# Patient Record
Sex: FEMALE | Race: WHITE | Employment: UNEMPLOYED | URBAN - METROPOLITAN AREA
[De-identification: names, ages, dates, MRNs, and addresses within clinical notes are randomized per-mention and may not be internally consistent; named-entity substitution may affect disease eponyms.]

---

## 2020-02-25 ENCOUNTER — HOSPITAL ENCOUNTER (INPATIENT)
Facility: CLINIC | Age: 35
LOS: 3 days | Discharge: HOME | End: 2020-02-28
Attending: EMERGENCY MEDICINE | Admitting: PSYCHIATRY & NEUROLOGY
Payer: MEDICAID

## 2020-02-25 DIAGNOSIS — R45.851 DEPRESSION WITH SUICIDAL IDEATION: Primary | ICD-10-CM

## 2020-02-25 DIAGNOSIS — R45.851 SUICIDAL IDEATION: ICD-10-CM

## 2020-02-25 DIAGNOSIS — F32.A DEPRESSION WITH SUICIDAL IDEATION: Primary | ICD-10-CM

## 2020-02-25 LAB
AMPHETAMINES UR QL SCN: NEGATIVE
BARBITURATES UR QL: NEGATIVE
BENZODIAZ UR QL: NEGATIVE
CANNABINOIDS UR QL SCN: POSITIVE
COCAINE UR QL: NEGATIVE
HCG UR QL: NEGATIVE
OPIATES UR QL SCN: NEGATIVE
PCP UR QL SCN: NEGATIVE

## 2020-02-25 PROCEDURE — 80307 DRUG TEST PRSMV CHEM ANLYZR: CPT | Performed by: EMERGENCY MEDICINE

## 2020-02-25 PROCEDURE — 90791 PSYCH DIAGNOSTIC EVALUATION: CPT

## 2020-02-25 PROCEDURE — 12400000 ZZH R&B MH

## 2020-02-25 PROCEDURE — 25000132 ZZH RX MED GY IP 250 OP 250 PS 637: Performed by: PSYCHIATRY & NEUROLOGY

## 2020-02-25 PROCEDURE — 99285 EMERGENCY DEPT VISIT HI MDM: CPT | Mod: 25

## 2020-02-25 PROCEDURE — 81025 URINE PREGNANCY TEST: CPT | Performed by: EMERGENCY MEDICINE

## 2020-02-25 RX ORDER — HYDROXYZINE HYDROCHLORIDE 25 MG/1
25 TABLET, FILM COATED ORAL EVERY 4 HOURS PRN
Status: DISCONTINUED | OUTPATIENT
Start: 2020-02-25 | End: 2020-02-28 | Stop reason: HOSPADM

## 2020-02-25 RX ORDER — QUETIAPINE FUMARATE 25 MG/1
25 TABLET, FILM COATED ORAL EVERY 4 HOURS PRN
Status: DISCONTINUED | OUTPATIENT
Start: 2020-02-25 | End: 2020-02-28 | Stop reason: HOSPADM

## 2020-02-25 RX ORDER — SERTRALINE HYDROCHLORIDE 100 MG/1
100 TABLET, FILM COATED ORAL DAILY
Status: DISCONTINUED | OUTPATIENT
Start: 2020-02-26 | End: 2020-02-25

## 2020-02-25 RX ORDER — SERTRALINE HYDROCHLORIDE 100 MG/1
100 TABLET, FILM COATED ORAL DAILY
COMMUNITY

## 2020-02-25 RX ORDER — SERTRALINE HYDROCHLORIDE 100 MG/1
100 TABLET, FILM COATED ORAL DAILY
Status: DISCONTINUED | OUTPATIENT
Start: 2020-02-26 | End: 2020-02-28 | Stop reason: HOSPADM

## 2020-02-25 RX ADMIN — QUETIAPINE 25 MG: 25 TABLET ORAL at 23:53

## 2020-02-25 ASSESSMENT — ACTIVITIES OF DAILY LIVING (ADL)
FALL_HISTORY_WITHIN_LAST_SIX_MONTHS: NO
TRANSFERRING: 0-->INDEPENDENT
AMBULATION: 0-->INDEPENDENT
RETIRED_EATING: 0-->INDEPENDENT
RETIRED_COMMUNICATION: 0-->UNDERSTANDS/COMMUNICATES WITHOUT DIFFICULTY
TOILETING: 0-->INDEPENDENT
COGNITION: 0 - NO COGNITION ISSUES REPORTED
SWALLOWING: 0-->SWALLOWS FOODS/LIQUIDS WITHOUT DIFFICULTY
BATHING: 0-->INDEPENDENT
DRESS: 0-->INDEPENDENT

## 2020-02-25 ASSESSMENT — MIFFLIN-ST. JEOR
SCORE: 1486.53
SCORE: 1493.79

## 2020-02-25 ASSESSMENT — ENCOUNTER SYMPTOMS: DYSPHORIC MOOD: 1

## 2020-02-25 NOTE — ED TRIAGE NOTES
Post partum depression. Pt is having suicidal ideations with no plan. Scared she will hurt 3 month old infant.
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
## 2020-02-25 NOTE — PHARMACY-ADMISSION MEDICATION HISTORY
Pharmacy Medication History  Admission medication history interview status for the 2/25/2020  admission is complete. See EPIC admission navigator for prior to admission medications     Medication history sources: Patient  Medication history source reliability: Good  Adherence assessment: Good    Significant changes made to the medication list:      Additional medication history information:       Medication reconciliation completed by provider prior to medication history? No    Time spent in this activity: 5 min      Prior to Admission medications    Medication Sig Last Dose Taking? Auth Provider   sertraline (ZOLOFT) 100 MG tablet Take 100 mg by mouth daily 2/25/2020 at Unknown time Yes Unknown, Entered By History

## 2020-02-26 LAB
ANION GAP SERPL CALCULATED.3IONS-SCNC: 5 MMOL/L (ref 3–14)
BUN SERPL-MCNC: 12 MG/DL (ref 7–30)
CALCIUM SERPL-MCNC: 9.2 MG/DL (ref 8.5–10.1)
CHLORIDE SERPL-SCNC: 110 MMOL/L (ref 94–109)
CO2 SERPL-SCNC: 25 MMOL/L (ref 20–32)
CREAT SERPL-MCNC: 0.84 MG/DL (ref 0.52–1.04)
ERYTHROCYTE [DISTWIDTH] IN BLOOD BY AUTOMATED COUNT: 14.3 % (ref 10–15)
GFR SERPL CREATININE-BSD FRML MDRD: >90 ML/MIN/{1.73_M2}
GLUCOSE SERPL-MCNC: 80 MG/DL (ref 70–99)
HCT VFR BLD AUTO: 43.3 % (ref 35–47)
HGB BLD-MCNC: 14.1 G/DL (ref 11.7–15.7)
MCH RBC QN AUTO: 29.4 PG (ref 26.5–33)
MCHC RBC AUTO-ENTMCNC: 32.6 G/DL (ref 31.5–36.5)
MCV RBC AUTO: 90 FL (ref 78–100)
PLATELET # BLD AUTO: 253 10E9/L (ref 150–450)
POTASSIUM SERPL-SCNC: 4.1 MMOL/L (ref 3.4–5.3)
RBC # BLD AUTO: 4.79 10E12/L (ref 3.8–5.2)
SODIUM SERPL-SCNC: 140 MMOL/L (ref 133–144)
TSH SERPL DL<=0.005 MIU/L-ACNC: 1.54 MU/L (ref 0.4–4)
WBC # BLD AUTO: 3.8 10E9/L (ref 4–11)

## 2020-02-26 PROCEDURE — 25000132 ZZH RX MED GY IP 250 OP 250 PS 637: Performed by: PSYCHIATRY & NEUROLOGY

## 2020-02-26 PROCEDURE — 85027 COMPLETE CBC AUTOMATED: CPT | Performed by: PSYCHIATRY & NEUROLOGY

## 2020-02-26 PROCEDURE — 84443 ASSAY THYROID STIM HORMONE: CPT | Performed by: PSYCHIATRY & NEUROLOGY

## 2020-02-26 PROCEDURE — 12400000 ZZH R&B MH

## 2020-02-26 PROCEDURE — 36415 COLL VENOUS BLD VENIPUNCTURE: CPT | Performed by: PSYCHIATRY & NEUROLOGY

## 2020-02-26 PROCEDURE — 80048 BASIC METABOLIC PNL TOTAL CA: CPT | Performed by: PSYCHIATRY & NEUROLOGY

## 2020-02-26 PROCEDURE — 99223 1ST HOSP IP/OBS HIGH 75: CPT | Mod: AI | Performed by: NURSE PRACTITIONER

## 2020-02-26 PROCEDURE — G0177 OPPS/PHP; TRAIN & EDUC SERV: HCPCS

## 2020-02-26 RX ORDER — QUETIAPINE FUMARATE 50 MG/1
50 TABLET, FILM COATED ORAL AT BEDTIME
Status: DISCONTINUED | OUTPATIENT
Start: 2020-02-26 | End: 2020-02-28 | Stop reason: HOSPADM

## 2020-02-26 RX ORDER — LORAZEPAM 0.5 MG/1
0.5 TABLET ORAL 2 TIMES DAILY
Status: DISCONTINUED | OUTPATIENT
Start: 2020-02-26 | End: 2020-02-28 | Stop reason: HOSPADM

## 2020-02-26 RX ADMIN — LORAZEPAM 0.5 MG: 0.5 TABLET ORAL at 20:31

## 2020-02-26 RX ADMIN — SERTRALINE HYDROCHLORIDE 100 MG: 100 TABLET ORAL at 09:11

## 2020-02-26 RX ADMIN — QUETIAPINE FUMARATE 50 MG: 50 TABLET ORAL at 20:31

## 2020-02-26 RX ADMIN — LORAZEPAM 0.5 MG: 0.5 TABLET ORAL at 12:44

## 2020-02-26 ASSESSMENT — ACTIVITIES OF DAILY LIVING (ADL)
HYGIENE/GROOMING: PROMPTS
DRESS: INDEPENDENT;SCRUBS (BEHAVIORAL HEALTH)
ORAL_HYGIENE: PROMPTS
LAUNDRY: WITH SUPERVISION

## 2020-02-26 NOTE — H&P
"Admitted:     02/25/2020      PSYCHIATRIC EVALUATION      IDENTIFYING DATA AND REASON FOR REFERRAL:  Lexy Miguel is a 34-year-old woman who reports she has been  for 8 years and has 2 children.  She is currently unemployed but last worked in .  She was referred for psychiatric hospitalization on account of worsening depression and suicidal ideations in the context of postpartum depression.  Information was gathered through direct patient contact as well as chart review.      CHIEF COMPLAINT:  \"I have had this before, but it is worse this time.\"      HISTORY OF PRESENT ILLNESS:  Lexy Miguel reports that she developed postpartum depression 4 years ago after the birth of her first child.  She states she was started on Lexapro after that delivery and she took the medication until she got pregnant with her second child, at which point she was transitioned to sertraline.  She reports she did well throughout pregnancy but after she had a baby 3 months ago she has been experiencing increased anxiety and depression.  She reportedly shared with her OB that she has become very irritable and experiences abrupt thoughts of flinging her child across the room or hurting her child.  She states she has never laid a hand on her child or hurt her child, and she has been getting supports to care for the child from her  who works from home 4 days a week as well as her parents and parents-in-law who are also available along with other friends to provide support.  She was recently placed on Zoloft 100 mg daily by [facility] with whom she had an appointment in a couple of days, but due to her increased thoughts of self-harm she presented to [facility] yesterday and was referred to the ED.  She was notably fidgety, expansive and had intrusive thoughts of hurting her 3-month-old daughter.  She was worried that she would not be able to control her thoughts.  She claims she had been " "thinking a lot about death with various plans for how she would accomplish her death to get away from what she reported worsening distress she felt when her daughter cried.  She reportedly uses cannabis on a daily basis to try and manage, but it was making her worse.      On direct interview, the patient endorsed neurovegetative symptoms of depression, but claims her anxiety is even worse than her depression.  She states she experiences racing thoughts and feels, \"it's like my brain cannot catch a breath.\"  She denies that she has been experiencing a decreased need for sleep or high energy.  She states she has too many things she needs done and finds it very overwhelming.  She states that it is worse now than previously.  She reports that she was switched from Celexa to Zoloft during pregnancy and now she is taking 100 mg daily.  She states she likes how she feels on Zoloft, but she thinks it is not enough.  She states she worries excessively about her finances because she and her  keep separate bank accounts.  She admits that she smokes marijuana to facilitate sleep.  She does not endorse the use of any other illicit chemicals.  However, the patient was notably easily distractible and there was concern about the possibility of hypomania versus ADHD.  She tells me that she very often feels overly active and compelled to do things like she was driven by a motor.  She states she feels this way all the time.  She admits that she is very fidgety and often avoids or delays getting started on projects that require a lot of thought.  She tells me that very often she has difficulty concentrating on what people say to her, even when they are speaking to her directly because she is always thinking about something else.  She states she is distracted by activity or noise around her and often leaves her seat in meetings or other situations where she is expected to remain seated.  She tells me she is very restless and " fidgety and has difficulty unwinding or relaxing when she has time to herself.  She states that she finds herself talking too much in social situations and finds herself finishing the sentences of others before they can finish them themselves.  She states very often she interrupts others when they are busy, but denies having difficulty waiting her turn in situations where this is required.      PAST PSYCHIATRIC HISTORY:  The patient denies previous psychiatric hospitalization.      CHEMICAL USE HISTORY:  As described in history of present illness.      PAST MEDICAL HISTORY:  None reported.      PAST SURGICAL HISTORY:   section, left areolar lesion resection, wisdom teeth extraction x4.      MEDICATIONS PRIOR TO ADMISSION:  Zoloft 100 mg p.o. daily.      FAMILY PSYCHIATRIC HISTORY:  The patient reports that her parents were alcoholics.      SOCIAL HISTORY:  The patient reports she was raised by her biological parents.  She has an older sister.  She reports graduating high school and doing 2 years of college.  She states she was a straight A student in high school but struggled once she got to college and ultimately flunked out of college.  She states that she tried to attend [employer], but this was very difficult for her.  She denies  or criminal history.      REVIEW OF SYSTEMS:  A 10-point review of systems was negative apart from the pertinent positives in the history of present illness.      VITAL SIGNS:  Blood pressure 135/79, pulse 75, respirations 16, temperature 97.6, weight 75.1 kg.      MENTAL STATUS EXAMINATION:  This is a bespectacled young lady with probable tinted hair who is dressed in hospital scrubs and ambulates on her own without difficulty.  She displays psychomotor restlessness and is easily distracted.  Her speech is copious and her mood is described as anxious, but she presents with a labile affect.  Her thought process is mostly logical, relevant and goal  directed.  She endorses absence of self-harm thoughts, plans or intent.  She does not endorse the presence of auditory or visual hallucinations and there are no delusions elicited.  Her impulse control is marginal.  Her gait and station are within normal limits.  Her muscle strength is adequate.  Her associations are concrete.  Her language is appropriate.  Her attention and concentration are limited.  She displays fair insight and judgment.  Risk assessment at this time is considered moderate to high on account of reported thoughts of self-harm and harm to her baby.      DIAGNOSTIC IMPRESSION:  Nam Lomax is a 34-year-old  mother of 2 with history of postpartum depression who presents to the hospital on account of worsening depression and anxiety.  She had expressed thoughts of suicide on account of her frustration with her thoughts.  She presents with mixed features of ADHD versus hypomania, even though she describes herself as being depressed.  She is currently on sertraline at 100 mg daily and this will be maintained while quetiapine is utilized at bedtime to facilitate sleep.      ADMITTING DIAGNOSES:   1.  Major depressive disorder, recurrent, severe with postpartum exacerbation.   2.  Rule out bipolar 2 disorder, current episode depressed.   3.  Generalized anxiety disorder.   4.  Rule out attention deficit hyperactivity disorder, predominantly combined type.      PLAN:  The patient will be maintained on the step-down unit.  Staff will provide a safe environment for her.  She will be encouraged to participate in individual, milieu and group therapy.  She will be maintained on Zoloft at 100 mg daily and Seroquel at 50 mg at bedtime while lorazepam will be added at 0.5 mg b.i.d.  Ongoing evaluation will be implemented with suicide precautions in place.  Estimated length of stay 3-5 days.         LISA MIRZA MD             D: 02/26/2020   T: 02/26/2020   MT: JOSSIE      Name:     NAM LOMAX    MRN:      [id number]        Account:      [id number]   :      1985        Admitted:     2020                   Document: [id number]       cc: Geo Wen NP

## 2020-02-26 NOTE — PLAN OF CARE
Pt presents with full range affect and calm mood. Pt acclimating well on the unit and starting to engage peers more/attend more groups. Pt appears cheery with no expressed concerns at this time. Pt states the Seroquel didn't work for helping her sleep but that the Ativan has helped her mind calm down enough to sleep. Med compliant, no SI stated.

## 2020-02-26 NOTE — PLAN OF CARE
BEHAVIORAL TEAM DISCUSSION    Participants: MD, RN, CM, PA, OT   Progress: No change  Anticipated length of stay: 3-5 days  Continued Stay Criteria/Rationale: Medication adjustment. Psychiatric stabilization.   Medical/Physical: Per hospitalist consult.   Precautions:   Behavioral Orders   Procedures    Code 1 - Restrict to Unit    Discontinue 1:1 attendant for suicide risk     Order Specific Question:   I have performed an in person assessment of the patient     Answer:   Based on this assessment the patient no longer requires a one on one attendant at this point in time.    Routine Programming     As clinically indicated    Status 15     Every 15 minutes.     Plan: The patient will be maintained on the step-down unit.  Staff will provide a safe environment for her.  She will be encouraged to participate in individual, milieu and group therapy.  She will be maintained on Zoloft at 100 mg daily and Seroquel at 50 mg at bedtime while lorazepam will be added at 0.5 mg b.i.d.  Ongoing evaluation will be implemented with suicide precautions in place.  Estimated length of stay 3-5 days.     Rationale for change in precautions or plan: Continued stabilization.

## 2020-02-26 NOTE — PROGRESS NOTES
02/26/20 1500   General Information   Date Initially Attended OT 02/26/20     Pt attended 1 of 2 OT groups today. Pt transitioned to OT group IND and engaged in therapeutic activity addressing functional cognition and interpersonal skills with task set up. Pt actively participated in therapeutic group activity and discussion addressing healthy habits. Educated pt on healthy habits of sleep hygiene, diet, exercise, and social/leisure engagement and how these impact mental health. Pt ID'd improving sleep hygiene as a health change she plans to make for improved wellness. More observation needed to complete initial evaluation at this time. Pt will continue to benefit from OT intervention to address implementation of positive functional coping skills, role performance, and community reintegration.

## 2020-02-26 NOTE — PROGRESS NOTES
02/25/20 2145   Valuables   Patient Belongings locker   Patient Belongings Sent Home none   Patient Belongings Remaining with Patient none   Patient Belongings Put in Hospital Secure Location (Security or Locker, etc.) other (see comments)   Did you bring any home meds/supplements to the hospital?  No     Pillow and case  Spiraled notebook with pen  Smart phone/ x 2  Ear buds  Calendar  Deodorant  fitbit  Pair of socks  hoodie  Underwear x 2  Sweater  Shirt  Pair of jeans  Lip balm  Pair of slippers    A               Admission:  I am responsible for any personal items that are not sent to the safe or pharmacy.  [facility] is not responsible for loss, theft or damage of any property in my possession.    Signature:  _________________________________ Date: _______  Time: _____                                              Staff Signature:  ____________________________ Date: ________  Time: _____      2nd Staff person, if patient is unable/unwilling to sign:    Signature: ________________________________ Date: ________  Time: _____     Discharge:  [facility] has returned all of my personal belongings:    Signature: _________________________________ Date: ________  Time: _____                                          Staff Signature:  ____________________________ Date: ________  Time: _____

## 2020-02-26 NOTE — H&P
"Admitted:     02/25/2020      OB/GYN:  Dr. Mendez, with [facility].      PSYCHIATRY:  None currently.      THERAPIST:  None currently.      CHIEF COMPLAINT:  Psychiatry history and physical.      HISTORY OF PRESENT ILLNESS:  Ms. Lexy Miguel is a 34-year-old female with past medical history significant for anxiety, depression, postpartum depression and cannabis use disorder who initially presented on 02/25/2020 accompanied by her  due to worsening suicidal ideation with concerns she may hurt her 3-month-old.  The patient notes history of postpartum depression after her first pregnancy; however, notes current postpartum depression substantially worse compared to previous.  The patient notes she is tearful and frequently crying with decreased appetite.  Per EMR, patient had expressed suicidal ideation with plan to duct tape her nose and mouth so that if she had changed her mind, she would not be able to remove the duct tape fast enough.  The patient had been started on Zoloft by her OB/GYN during pregnancy and has recently been increased.  The patient was to follow up with the psychiatrist in 2 days; however, due to worsening suicidal ideation with possible plan, the patient presented to ED.      Presently, patient is seen on the step-down Psychiatry unit.  The patient was initially sleeping at time of arrival; however, easily awakens and sits up in bed without difficulty.  The patient states a very difficult night due to insomnia.  The patient notes at home she has a noisemaker and her \"special pillow.\"  However, she was not able to have either; therefore, the patient notes difficulty falling asleep.  The patient also notes use of cannabis every night to \"help calm her and help her fall asleep;\" however, unable to use tonight as now admitted inpatient.  Presently, the patient reports no current or recent chest pain, shortness of breath, diarrhea, constipation, fevers, or chills.  The patient does note " intermittent abdominal cramping with nausea, vomiting, most recently as a couple days ago; however, she attributes to current stressors.      PAST MEDICAL HISTORY:   1.  Anxiety.   2.  Depression.   3.  Postpartum depression.   4.  Cannabis use disorder.      PAST SURGICAL HISTORY:   1.   section.   2.  Left areolar lesion resection.   3.  Treece teeth extraction x 4.      SOCIAL HISTORY:   1.  Tobacco:  None.   2.  Alcohol:  Drinks 2 glasses of wine per week and 2 glasses of wine per weekend.   3.  Illicit drugs:  Cannabis use daily before bed.   4.  Lives in a condo with her significant other, 4-year-old and 3-month-old.      FAMILY HISTORY:  Paternal grandfather with myocardial infarction at age less than 50.      ALLERGIES:  NO KNOWN DRUG ALLERGIES.      MEDICATIONS:    Medications Prior to Admission   Medication Sig Dispense Refill Last Dose     sertraline (ZOLOFT) 100 MG tablet Take 100 mg by mouth daily   2020 at Unknown time     REVIEW OF SYSTEMS:  A 10-point review of systems was completed.  All pertinent positives are noted in the HPI.  All other systems negative.      PHYSICAL EXAMINATION:   VITAL SIGNS:  Reviewed and are as follows:  Temperature 97.8, blood pressure 154/95, heart rate 45, respiratory rate 18, O2 sats 99% on room air.   CONSTITUTIONAL:  The patient sitting up in bed, awake, alert, cooperative, in no apparent distress, appears stated age, healthy-appearing and well groomed.   HEENT:  His Pupils equal, round and reactive to light.  Extraocular muscles intact.  Sclerae are clear.  Normocephalic, atraumatic.  Oropharynx with dry mucous membranes.   NECK:  Supple.  Normal range of motion, no nuchal rigidity noted.   LUNGS:  No increased work of breathing.  Clear to auscultation bilaterally posteriorly.  No crackles or wheezing noted.   CARDIOVASCULAR:  Normal apical pulse, bradycardic rate and rhythm, normal S1 and S2.  No murmur, rub or gallop noted.   ABDOMEN:  Normal bowel  sounds, soft, nondistended, nontender.  No masses palpated.  No guarding or rebound tenderness noted.   EXTREMITIES:  Moves all 4 extremities.  Dorsalis pedis and radial pulses palpable bilaterally.  Lower extremities with no edema.   NEUROLOGIC:  Awake, alert and oriented.  Cranial nerves II-XII are grossly intact.  Sensory is intact.  Speech fluent.   SKIN:  Warm and dry.  No lesions or rash noted.  No erythema.   PSYCHIATRIC:  Mentation appears normal and affect flat.  Intermittent eye contact throughout interview.  Appears restless and fidgety at times.      LABORATORY DATA:  Reviewed in Epic.     ASSESSMENT AND PLAN:  Ms. Lexy Miguel is a 34-year-old female with past medical history significant for anxiety, depression, postpartum depression and cannabis use disorder who initially presented on 2020 accompanied by her  with worsening suicidal ideation with plan to duct tape her nose and mouth and concerned that she may hurt her 3-month-old.  The patient has been admitted to inpatient psychiatry for further evaluation and management.     Suicidal ideation with plan.   Anxiety.   Depression.   Postpartum depression in the setting of recent delivery of 3-month-old.   --  Defer management of above to inpatient primary Psychiatry services.     Cannabis use disorder.   --  Recommend cessation.      DVT PROPHYLAXIS:  Encourage out of bed and ambulation daily.      CODE STATUS:  Full code.      DISPOSITION:  Per primary Psychiatry Service.      This patient was discussed with Dr. Ren Carreon of the Hospitalist Service who agrees with current plans.  At this time, the Hospitalist Service will sign off.  Please contact our service with any questions or concerns of which we may be of assistance with.         REN CARREON MD       As dictated by SHERIF ROGERS NP            D: 2020   T: 2020   MT: IVETT      Name:     LEXY MIGUEL   MRN:      [id number]        Account:      [id number]   :       1985        Admitted:     02/25/2020                   Document: [id number]       cc: Geo Mendez MD

## 2020-02-26 NOTE — PROGRESS NOTES
"Patient  is a 34 year old female with a history of post partum depression, who presented to the ED for evaluation for suicidal ideation. The patient was brought to the emergency department by her . Patient endorses suicidal ideation, but verbally contracts for safety. The patient  has a 3 month-old baby and endorses having thoughts of hurting the baby. Says she has contemplated wrapping a duck tape around herself or baby. Patient also endorses using cannabis to help her symptoms, \"but it has not helped.\"Upon admission, patient is A&O, presents as tearful. Mood with depressed, with full range affect. Overall, patient is calm and cooperative. Thought process is organized. Insight is fair.     Nursing assessment complete including patient and medication profiles. Risk assessments completed addressing suicide,fall,skin,nutrition and safety issues. Care plan initiated. Assessments reviewed with physician and admit orders received. Video monitoring in progress, Patient Informed. Welcome packet reviewed with patient. Information reviewed includes getting emergency help, preventing infections, understanding your care, using medication safely, reducing falls, preventing pressure ulcers, smoking cessation, powerful choices and Patients Bill of Rights. Pt. given tour of the unit and instruction on use of facility including emergency call light. Program schedule reviewed with patient. Questions regarding the unit addressed. Pt. Search completed and belongings inventoried.            "

## 2020-02-27 PROCEDURE — 25000132 ZZH RX MED GY IP 250 OP 250 PS 637: Performed by: PSYCHIATRY & NEUROLOGY

## 2020-02-27 PROCEDURE — G0177 OPPS/PHP; TRAIN & EDUC SERV: HCPCS

## 2020-02-27 PROCEDURE — 12400000 ZZH R&B MH

## 2020-02-27 RX ORDER — IBUPROFEN 200 MG
400-600 TABLET ORAL EVERY 6 HOURS PRN
Status: DISCONTINUED | OUTPATIENT
Start: 2020-02-27 | End: 2020-02-28 | Stop reason: HOSPADM

## 2020-02-27 RX ADMIN — SERTRALINE HYDROCHLORIDE 100 MG: 100 TABLET ORAL at 09:10

## 2020-02-27 RX ADMIN — QUETIAPINE FUMARATE 50 MG: 50 TABLET ORAL at 20:42

## 2020-02-27 RX ADMIN — LORAZEPAM 0.5 MG: 0.5 TABLET ORAL at 09:11

## 2020-02-27 RX ADMIN — LORAZEPAM 0.5 MG: 0.5 TABLET ORAL at 20:42

## 2020-02-27 RX ADMIN — IBUPROFEN 400 MG: 200 TABLET, FILM COATED ORAL at 22:35

## 2020-02-27 ASSESSMENT — ACTIVITIES OF DAILY LIVING (ADL)
LAUNDRY: WITH SUPERVISION
HYGIENE/GROOMING: INDEPENDENT
ORAL_HYGIENE: INDEPENDENT
DRESS: INDEPENDENT

## 2020-02-27 NOTE — PROGRESS NOTES
[facility] Psychiatric Progress Note      Interval History:   Pt seen, team meeting held with , nursing staff, OT, and PA's to review diagnosis and treatment plan.  Staff reported the patient has been more giddy, bright and interactive today.  She has been advised of the deleterious effects of cannabis particularly given her history of mood lability and suicidal ideations.  She was advised that she may potentially meet criteria for bipolar 2 disorder and this was explained to her in detail.  Patient became very emotional because of the uncertainty with respect to her psychiatric illness and she was advised that she would need to continue care with her outpatient psychiatric provider for long-term diagnostic clarification and medication management.  The potential benefits of utilizing quetiapine and lorazepam were discussed with her and she was advised that she will be discharged on the current medications.  She denies active self-harm thoughts plans or intent and does not endorse any infanticidal thoughts or plans.  She tells me that she really likes smoking marijuana as it calms her but she realizes that she has to quit using it.  She is tolerating currently prescribed medications and reports some tiredness.     Review of systems:   The Review of Systems completed by Won Hernandez MD is negative other than noted in the HPI     Medications:       LORazepam  0.5 mg Oral BID     QUEtiapine  50 mg Oral At Bedtime     sertraline  100 mg Oral Daily     hydrOXYzine, QUEtiapine    Mental Status Examination:     Appearance:  awake, alert, adequately groomed, appeared as age stated and cooperative  Attitude:  cooperative  Eye Contact:  good  Mood:  better  Affect:  mood congruent and friendly, pleasant, cheerful and tearful  Speech:  clear, coherent and normal prosody  Psychomotor Behavior:  no evidence of tardive dyskinesia, dystonia, or tics and intact station, gait and muscle  "tone  Thought Process:  logical, linear and goal oriented  Associations:  no loose associations  Thought Content:  no evidence of suicidal ideation or homicidal ideation and no evidence of psychotic thought  Insight:  fair  Judgment:  fair  Oriented to:  time, person, and place  Attention Span and Concentration:  intact  Recent and Remote Memory:  fair  Language: Able to name objects, Able to repeat phrases and Able to read and write  Fund of Knowledge: appropriate  Muscle Strength and Tone: normal  Gait and Station: Normal          Labs/Vitals:   /76   Pulse 137   Temp 98.3  F (36.8  C) (Oral)   Resp 16   Ht 1.702 m (5' 7\")   Wt 75.1 kg (165 lb 9.6 oz)   SpO2 100%   BMI 25.94 kg/m    No results found for this or any previous visit (from the past 24 hour(s)).    Impression:   Lexy Miguel is a 34-year-old  mother of 2 with history of postpartum depression who presents to the hospital on account of worsening depression and anxiety.  She had expressed thoughts of suicide on account of her frustration with her thoughts.  She presents with mixed features of ADHD versus hypomania, even though she describes herself as being depressed.  She is currently on sertraline at 100 mg daily and this will be maintained while quetiapine is utilized at bedtime to facilitate sleep.       DIagnoses:     1.  Major depressive disorder, recurrent, severe with postpartum exacerbation.   2.  Rule out bipolar 2 disorder, current episode depressed.   3.  Generalized anxiety disorder.   4.  Rule out attention deficit hyperactivity disorder, predominantly combined type.          Plan:   1. Written information given on medications. Side effects, risks, benefits reviewed.  2. Medication changes: None.  3. Legal Status: Voluntary  4.  Continue hospitalization.  For possible discharge tomorrow if improvement is sustained.      Attestation:  Patient has been seen and evaluated by me, Won Hernandez MD " today.    PATIENT ID  Name: Lexy Miguel  MRN:[id number]  YOB: 1985

## 2020-02-27 NOTE — PLAN OF CARE
Problem: Depressive Symptoms  Goal: Depressive Symptoms  Description  Signs and symptoms of listed problems will be absent or manageable.  Outcome: Improving  Flowsheets (Taken 2/27/2020 1406)  Depressive Symptoms Assessed: all  Depressive Symptoms Present: anxiety  Note:   Pt presents with a full range affect and calm to anxious mood. Pt thought process is organized and insight is fair. Pt is withdrawn to room bed resting or napping for most of the shift. Pt is bright and pleasant with peers and staff when visible on the unit. Pt states she feels a lot more like herself than when she first got her, and that she is doing well. Pt is a bit anxious and confused about her new medication Seroquel and whether or not it will help her mood enough during the day once she goes home, as well as her possible new diagnoses of Bipolar and/or ADHD. Pt is aware that diagnosing mental illness and finding the right medication(s) is a process and it will take some time. Pt denies any thoughts of hurting herself or others and Si.

## 2020-02-27 NOTE — PROGRESS NOTES
Case Management Social History    This information has been obtained from patient's chart and through a personal interview with patient. She is deemed to be a reliable historian.     Reason for Admission:  Lexy Miguel is a 34 year old  female admitted to [facility] on 02/25/2020. She is currently hospitalized voluntarily. She states that she presented for care at the hospital due to thoughts of suicide which scared her. She has a three month old baby who is very colicky and she felt unable to cope despite good support from the children's father as well as extended family. She states that her ex- gets up at night with the baby and also works from home four days a week so that he is available to help with the children. She states that she had a plan to cover her mouth and nose with duct tape in order to suffocate herself, as she did not want to create a mess. She denies any thoughts of suicide or self harm at this time. Besides having the new baby, another stressor is finances. She is not working currently, but she states that she has been caring for another child in her home in order to earn a little bit of money.       Previous Mental & Chemical Dependency History:  This is her first mental health hospitalization. She has no past history of ECT or civil commitment.     She drinks two cups of coffee with caffeine daily. She does not smoke cigarettes. She states that she will have a couple of glasses of wine over the course of a week. She denies any issues with gambling. She admits to daily marijuana usage as she states that it helps her to sleep.       Social History:  She is currently . She states that she and her ex-  in July 2018. However they got back together in December 2018. They have not remarried, however. They have two children, a son who is four years old and a daughter who is three months old. She was born in [place] and grew  up in [place], [place] as well as other [facility] in [place]. She states that her father worked for [employer] which is based in [place], [place]. Her parents are  and currently live in [place]. She has one older sister who lives in the area as well. She states that there is a family history of alcoholism on both sides of the family.       Worship: She does not identify with any particular Buddhism or spiritual orientation. She does not wish to see a  at this time.        History: She has no history of  service. Her ex- is in the [employer] working in .  He was supposed to be sent to [employer] for six weeks of training, but that has since been cancelled in light of her post partum depression.       Education and Work History:  She graduated from high school in [place] in 2006. She attended two years of college at the [employer] in [place] before withdrawing due to depression which she blames on taking Accutane. She then took one semester of classes at [employer] and a partial semester at [employer]. She has not completed a degree, but she states that she would like to get her bachelor's degree some day. She is currently unemployed. She last worked at [employer] doing  prior to the birth of her daughter. Her ex- works for the [employer] in [employer].       Living Situation: She lives with her ex- and children in an apartment in [place].       Financial Status/Stressors:  She does not currently receive social security or disability income.       Medication Coverage: She denies any issues at this time with affording her medication co-pays.       Insurance:  She does not currently have insurance. She states that she signed up for insurance but had not yet paid the premium. She met with the business office yesterday and was given the number to call [facility] to find  out how to pay for her premium and activate her insurance. She states that her ex- and children are covered through his employer. But because she and her ex- are , she can not get on his insurance at this time.       Legal Issues:  She denies any current legal issues at this time. She is her own guardian.       Community Resources: Her primary care provider is Dr. Geo Mendez at [facility] in [facility]. She recently started seeing Aracely Wen at [facility] in [facility] for medication management. She does not currently have an individual therapist. She states that she and her ex- have done couples counseling recently. She is able to drive to her appointments.       Social Functioning:  She enjoys cleaning, music, spending time with friends and being outdoors.         Discharge Considerations:  She would like to return to existing medication management provider at discharge. She is open to seeing a therapist. As she lives in [place],  discussed that a good option for therapy may be through [facility] in [place]. She is in agreement with pursuing therapy there. She would like information on [facility] as a resource for both her and her family. Case Management will remain available to assist with any discharge needs.

## 2020-02-27 NOTE — PLAN OF CARE
Pt mood is clam with full range affect. Pt is cooperative and rspectful to staff and peers. Pt thought process and insight is fair and organized. Judgment is appropriate. Denies any SI nor hallucination. Pt was med compliant. Pt attended groups and participate well.

## 2020-02-28 VITALS
BODY MASS INDEX: 25.99 KG/M2 | WEIGHT: 165.6 LBS | RESPIRATION RATE: 16 BRPM | HEART RATE: 60 BPM | HEIGHT: 67 IN | SYSTOLIC BLOOD PRESSURE: 119 MMHG | TEMPERATURE: 97.8 F | DIASTOLIC BLOOD PRESSURE: 76 MMHG | OXYGEN SATURATION: 98 %

## 2020-02-28 PROCEDURE — 25000132 ZZH RX MED GY IP 250 OP 250 PS 637: Performed by: PSYCHIATRY & NEUROLOGY

## 2020-02-28 RX ORDER — LORAZEPAM 0.5 MG/1
0.5 TABLET ORAL 2 TIMES DAILY PRN
Qty: 30 TABLET | Refills: 0 | Status: SHIPPED | OUTPATIENT
Start: 2020-02-28 | End: 2020-03-29

## 2020-02-28 RX ORDER — QUETIAPINE FUMARATE 50 MG/1
50 TABLET, FILM COATED ORAL AT BEDTIME
Qty: 30 TABLET | Refills: 0 | Status: SHIPPED | OUTPATIENT
Start: 2020-02-28 | End: 2020-03-29

## 2020-02-28 RX ADMIN — LORAZEPAM 0.5 MG: 0.5 TABLET ORAL at 09:05

## 2020-02-28 RX ADMIN — SERTRALINE HYDROCHLORIDE 100 MG: 100 TABLET ORAL at 09:05

## 2020-02-28 ASSESSMENT — ACTIVITIES OF DAILY LIVING (ADL)
DRESS: INDEPENDENT
LAUNDRY: WITH SUPERVISION
ORAL_HYGIENE: INDEPENDENT
HYGIENE/GROOMING: INDEPENDENT

## 2020-02-28 NOTE — DISCHARGE INSTRUCTIONS
Behavioral Discharge Planning and Instructions    Summary: Admitted with worsening depression and suicidal ideations in the context of postpartum depression    Main Diagnosis:   1.  Major Depressive Disorder, recurrent, severe with postpartum exacerbation.   2.  Rule out Bipolar 2 Disorder, current episode depressed.   3.  Generalized Anxiety Disorder.   4.  Rule out Attention Deficit Hyperactivity Disorder, predominantly combined type.     Major Treatments, Procedures and Findings:  Psychiatric assessment. Medication adjustment.     Symptoms to Report: Losing more sleep, Mood getting worse or Thoughts of suicide    Lifestyle Adjustment:  Follow all treatment recommendations. Develop and follow safety plan. Your safety plan is your contract with yourself to keep you safe in a crisis. Be sure to use the resources and crisis numbers listed on your safety plan.  Abstain from the use of all mood-altering substances, including alcohol, as usage may increase symptoms of depression as well as interfere with the effectiveness of any prescribed medications.     Psychiatry Follow-up:     You have indicated that you will schedule your own follow up psychiatry appointment with Dr. Aracely Wen at [facility]. It is recommended that you make a follow up appointment within the next two weeks so that you can get your medications refilled.     [facility]  [address] [place]  [address]  Phone:  [phone] / Fax:  [phone]    Please make a therapy appointment as soon as you have insurance in place.  No appointments have been made for you at this time.     [facility] [place]  [id number] [place] [address], [address]  [place] [address] [phone] / [phone] fax    You were seen by the [facility] while you were here. If you have any further questions, please contact them at  [phone].     Resources:   Crisis Intervention: 206.759.7239 or 371-417-6479 (TTY: 489.263.1555).  Call anytime for help.  National [place] on Mental Illness (www.[place].peter.org): 843.634.5498 or 787-625-0004.  Alcoholics Anonymous (www.alcoholics-anonymous.org): Check your phone book for your local chapter.  National Suicide Prevention Line (www.mentalhealthmn.org): 437-511-SKHT (8086)  COPE (Crisis Services for Adults) in [facility]: 510.234.3866 (Available 24/7)    General Medication Instructions:   See your medication sheet(s) for instructions.   Take all medicines as directed.  Make no changes unless your doctor suggests them.   Go to all your doctor visits.  Be sure to have all your required lab tests. This way, your medicines can be refilled on time.  Do not use any drugs not prescribed by your doctor.  Avoid alcohol.    Thank you for choosing [facility]. The treatment team has appreciated the opportunity to work with you. If you have any questions following discharge, you can call [address] at [phone] or you can reach out to your outpatient provider.

## 2020-02-28 NOTE — PLAN OF CARE
Pt presents with full range affect, calm mood. Pt insight is bright with organized thought process and improved judgment. Pt is social and attended groups, med compliant. Pt denies hallucination nor SI. Pt is discharge Home to her  via personal transport with some coping skills to avoid stressful situations and seek help when in need. Pt plans to have the mother in law care for her child for at least a week or two for her to fully recover. Discuss all medication orders with patient who verbalized understanding. All belongings sent home with patient.

## 2020-02-28 NOTE — PLAN OF CARE
"Shift Update: Pt mood is anxious and affect is full range. Pt spent time socializing with peers and and her visitor during shift. Pt was pleasant to staff and peers. Pt stated that she thinks that she is doing better at the hospital because most of her stressors are situational. Pt states that she has help at home, but it makes her feel \"uncomfortable\" when she asks. Pt is interested in different therapies to help her deal with anxiety and her possible bipolar diagnosis.   Thought process is intact. Insight is fair. Pt denies suicidal ideation and hi.  "

## 2020-03-05 NOTE — DISCHARGE SUMMARY
[facility]    Discharge Summary  Adult Psychiatry    Date of Admission:  2/25/2020  Date of Discharge:  2/28/2020  2:27 PM  Discharging Provider: Won Hernandez MD      Discharge Diagnoses   1.  Major depressive disorder, recurrent, severe with postpartum exacerbation.   2.  Rule out bipolar 2 disorder, current episode depressed.   3.  Generalized anxiety disorder.   4.  Rule out attention deficit hyperactivity disorder, predominantly combined type.     History of Present Illness   Lexy Miguel is a 34-year-old woman who reports she has been  for 8 years and has 2 children.  She is currently unemployed but last worked in .  She was referred for psychiatric hospitalization on account of worsening depression and suicidal ideations in the context of postpartum depression.  Information was gathered through direct patient contact as well as chart review. For more details, I refer the reader to the initial psychiatric assessment documented on admission by Won Hernandez MD    Hospital Course   Lexy Miguel was admitted on 2/25/2020. Following admission to the unit, the patient was introduced to individual, milieu and group therapy. Staff provided active listening and validated her concerns. She was seen by the hospitalist who identified no acute medical issues. She was encouraged to discontinue the use of cannabis, which the patient admitted to using on a daily basis. She underwent psychiatric evaluation during which she describes symptoms suggestive of both ADHD and bipolar disorder. She was advised of available treatment options, but encouraged to consider mitigating her racing thoughts, insomnia and reported mood lability with the addition of quetiapine to her current regimen while maintaining her prior to admission dose of sertraline as a result of her underlying anxiety. The patient expressed confusion about her proposed diagnoses as she wanted a definitive  plan to address her symptoms going forward. Within a day of hospitalization, she was already devoid of neurovegetative symptoms of depression and denied suicidal thoughts, plans or intent. She also denied having any thoughts of harming her child. She was very expansive and interactive on the unit with display of poor boundaries. She reported improvement in her sleep. The deleterious effects of cannabis were discussed with her, especially given her mood disorder and she verbalizes understanding. She had been off the opinion that particular brand or variety of cannabis that she was procuring was suitable for addressing her anxiety. In the context of her hospitalization, she also learned that she did not have medical insurance like she had thought because she did not complete the application process on [employer]. She indicated that she and her  were , but reside together and that children are covered under his insurance, which would not cover her. She recompensated quickly and soon began to request discharge from the hospital with a plan to follow-up with her outpatient providers. At the point of discharge was devoid of self-harm, thoughts, plans or intent and reported future orientation.    Won Hernandez MD    Significant Results and Procedures   Please see below.    Unresulted Labs Ordered in the Past 30 Days of this Admission     No orders found from 1/26/2020 to 2/26/2020.          Code Status   Full Code    Primary Care Physician   Geo Mendez    Physical Exam   Appearance:  awake, alert, adequately groomed and casually dressed  Attitude:  cooperative  Eye Contact:  good  Mood:  good  Affect:  appropriate and in normal range and mood congruent  Speech:  clear, coherent and normal prosody  Psychomotor Behavior:  no evidence of tardive dyskinesia, dystonia, or tics and intact station, gait and muscle tone  Thought Process:  logical, linear and goal oriented  Associations:  no loose  associations  Thought Content:  no evidence of suicidal ideation or homicidal ideation and no evidence of psychotic thought  Insight:  good  Judgment:  intact  Oriented to:  time, person, and place  Attention Span and Concentration:  intact  Recent and Remote Memory:  intact  Language: Able to name objects and Able to repeat phrases  Fund of Knowledge: appropriate  Muscle Strength and Tone: normal  Gait and Station: Normal    Time Spent on this Encounter   Won BULLOCK MD, personally saw the patient today and spent greater than 30 minutes discharging this patient.    Discharge Disposition   Discharged to home  Condition at discharge: Stable    PSYCHIATRY FOLLOW-UP:  You have indicated that you will schedule your own follow up psychiatry appointment with Dr. Aracely Wen at [facility]. It is recommended that you make a follow up appointment within the next two weeks so that you can get your medications refilled.     [facility]  [address] [place]  [address]  Phone:  [phone] / Fax:  [phone]    Please make a therapy appointment as soon as you have insurance in place.  No appointments have been made for you at this time.     [facility]  [address] [place] [address], [address]  [place], [place] [id number] [phone] / [phone] fax    You were seen by the [facility] while you were here. If you have any further questions, please contact them at [phone].     Consultations This Hospital Stay   HOSPITALIST IP CONSULT    Discharge Orders   No discharge procedures on file.  Discharge Medications   Discharge Medication List as of 2/28/2020 12:54 PM      START taking these medications    Details   LORazepam (ATIVAN) 0.5 MG tablet Take 1 tablet (0.5 mg) by mouth 2 times daily as needed for anxiety, Disp-30 tablet, R-0, Local Print       QUEtiapine (SEROQUEL) 50 MG tablet Take 1 tablet (50 mg) by mouth At Bedtime, Disp-30 tablet, R-0, E-Prescribe         CONTINUE these medications which have NOT CHANGED    Details   sertraline (ZOLOFT) 100 MG tablet Take 100 mg by mouth daily, Historical           Allergies   Allergies   Allergen Reactions     Milk Protein Extract GI Disturbance     Soy Allergy GI Disturbance     Data   Most Recent 3 CBC's:  Recent Labs   Lab Test 20  0916   WBC 3.8* 11.4*   HGB 14.1 13.6   MCV 90 88    194      Most Recent 3 BMP's:  Recent Labs   Lab Test 20  0901 16     --    POTASSIUM 4.1  --    CHLORIDE 110*  --    CO2 25  --    BUN 12 6*   CR 0.84 0.82   ANIONGAP 5  --    LEATHA 9.2  --    GLC 80  --      Most Recent 2 LFT's:  Recent Labs   Lab Test 16   AST 26   ALT 22      Panel:No lab results found.  Most Recent 6 Bacteria Isolates From Any Culture (See EPIC Reports for Culture Details):No lab results found.  Most Recent TSH, T4 and A1c Labs:  Recent Labs   Lab Test 20   TSH 1.54     Results for orders placed or performed during the hospital encounter of 12/31/15   [facility] F/U    Narrative            Comp Follow Up  ---------------------------------------------------------------------------------------------------------  Pat. Name: NAM LOMAX       Study Date:  2015 11:48am  Pat. NO:  [id number]        Referring  MD: JEFFERSON PAK  Site:  [facility]       Sonographer: Elisa Valladares RDMS  :  [id number]        Age:   30  ---------------------------------------------------------------------------------------------------------    INDICATION  ---------------------------------------------------------------------------------------------------------  Umbilical vein verix.      METHOD  ---------------------------------------------------------------------------------------------------------  Transabdominal ultrasound  "examination. Sufficient.      PREGNANCY  ---------------------------------------------------------------------------------------------------------  Jain pregnancy. Number of fetuses: 1.      DATING  ---------------------------------------------------------------------------------------------------------                                           Date                                Details                                                                                      Gest. age                      JUAN  LMP                                  4/29/2015                        Cycle: regular cycle. Cycle length 24 d                                         35 w + 1 d                     2/3/2016  \"Stated Dating\"                   9/22/2015                        GA: 22 w + 4 d, by per outside U/S                                              36 w + 6 d                     1/22/2016  U/S                                   12/31/2015                       based upon AC, BPD, Femur, HC                                                37 w + 5 d                     1/16/2016  Assigned dating                  Dating performed on 11/19/2015, based on the LMP                                                             35 w + 1 d                     2/3/2016      GENERAL EVALUATION  ---------------------------------------------------------------------------------------------------------  Cardiac activity: present.  bpm.  Fetal movements: visualized.  Presentation: cephalic.  Placenta: posterior.  Umbilical cord: 3 vessel cord, umbilical cord verix 1.4 x1.4 cm.  Amniotic fluid: MVP 6.8 cm. JAYNE 18.6 cm. Q1 6.4 cm, Q2 6.8 cm, Q3 3.0 cm, Q4 2.4 cm.      FETAL BIOMETRY  ---------------------------------------------------------------------------------------------------------  Main Fetal Biometry:  BPD                                   95.9            mm                                         39w 1d                           "     Hadlock  OFD                                   118.7           mm                                                                                   HC                                      343.0          mm                                        39w 4d                               Hadlock  AC                                      333.4          mm                                        37w 2d                               Hadlock  Femur                                 68.3            mm                                        35w 1d                               Hadlock  Cerebellum tr                       48.3            mm                                                                                   Humerus                             60.7            mm                                         35w 1d                              Yecenia  Fetal Weight Calculation:  EFW                                   3,142          g                    81%                  37w 4d                              Klaus  EFW (lb,oz)                         6 lb 15        oz  Calculated by                            Gildardo (BPD-HC-AC-FL)  Head / Face / Neck Biometry:                                        5.6              mm                                          Amniotic Fluid / FHR:  AF MVP                              6.8             cm                                                                                     JAYNE                                     18.6            cm                                                                                     FHR                                    139             bpm                                             FETAL ANATOMY  ---------------------------------------------------------------------------------------------------------  The following structures were visualized with normal appearance:  Kidneys                               Left kidney.    The following structures  were abnormal:  Kidneys                               Right kidney: located in pelvis.    The following structures were visualized:  Head                                   Head size. Head shape.  Brain                                   Lateral cerebral ventricles. Cisterna magna. Midline falx. Thalami. Cavum septi pellucidi.  Face                                   Profile.  Spine                                  Cervical spine. Thoracic spine. Lumbar spine. Sacral spine.  Heart                                   Four chamber view. Left ventricular outflow tract. Right ventricular outflow tract.  Abdominal wall                     Abdominal wall and fetal cord insertion appear normal.  Stomach                              Stomach size and situs appear normal.  Bladder                                Bladder appears normal in size and shape.    Gender: male.      BIOPHYSICAL PROFILE  ---------------------------------------------------------------------------------------------------------  Qualitative AFV: 2.  Fetal breathing movements: 2.  Gross body movements: 2.  Fetal tone: 2.  Biophysical profile score: 8 / 8.      FETAL DOPPLER  ---------------------------------------------------------------------------------------------------------  Umbilical Artery  HR                                      142             bpm                                             MATERNAL STRUCTURES  ---------------------------------------------------------------------------------------------------------  Cervix                                  Not examined.  Right Ovary                          Not examined.  Left Ovary                            Not examined.      RECOMMENDATION  ---------------------------------------------------------------------------------------------------------  We discussed the findings on today's ultrasound with the patient.    We recommend a  ultrasound to confirm the pevic kidney. Return to primary  provider for continued prenatal care.    Thank-you for the opportunity to participate in the care of this patient. If you have questions regarding today's evaluation or if we can be of further service, please contact the  [facility].    **Fetal anomalies may be present but not detected**.        Impression    IMPRESSION  ---------------------------------------------------------------------------------------------------------  Growth parameters and estimated fetal weight were consistent with appropriate for gestational age pattern of growth.Intraabdominal umbilical vein varix noted which  measure 14 mm in diameter, slightly increased in size from the prior exam. There does not appear to be turbulent flow or clot formation. Pelvic kidney suspected. Fetal  anatomy appeared otherwise normal for gestational age. Normal JAYNE.BPP is reassuring.